# Patient Record
Sex: MALE | Race: AMERICAN INDIAN OR ALASKA NATIVE | NOT HISPANIC OR LATINO | URBAN - METROPOLITAN AREA
[De-identification: names, ages, dates, MRNs, and addresses within clinical notes are randomized per-mention and may not be internally consistent; named-entity substitution may affect disease eponyms.]

---

## 2018-03-23 ENCOUNTER — EMERGENCY (EMERGENCY)
Facility: HOSPITAL | Age: 53
LOS: 1 days | Discharge: HOME | End: 2018-03-23
Attending: EMERGENCY MEDICINE | Admitting: EMERGENCY MEDICINE
Payer: COMMERCIAL

## 2018-03-23 VITALS
DIASTOLIC BLOOD PRESSURE: 94 MMHG | OXYGEN SATURATION: 100 % | SYSTOLIC BLOOD PRESSURE: 155 MMHG | HEART RATE: 83 BPM | TEMPERATURE: 98 F | RESPIRATION RATE: 16 BRPM

## 2018-03-23 PROCEDURE — 99282 EMERGENCY DEPT VISIT SF MDM: CPT

## 2018-03-23 NOTE — ED PROVIDER NOTE - MUSCULOSKELETAL MINIMAL EXAM
motor intact/nml gait. no midline spinal TTP. strength is 5/5/normal range of motion/neck supple/TENDERNESS

## 2018-03-23 NOTE — ED PROVIDER NOTE - MEDICAL DECISION MAKING DETAILS
51 y/o M pt with likely MSK. lower back pain. Low suspicion for fx or cord compression. Plan: instructions for appropriate Ibuprofen dosing. Out-pt f/u

## 2018-03-23 NOTE — ED PROVIDER NOTE - CHPI ED SYMPTOMS NEG
no bladder dysfunction/no visual changes, numbness/tingling, fever, chills, nausea, vomiting, urinary sx, abd pain,

## 2018-03-23 NOTE — ED PROVIDER NOTE - OBJECTIVE STATEMENT
53 y/o M pt with no sig PMHx, arrives to the ED c/o worsening (sitting) lower back pain with associated LLE pain s/p shoveling snow yesterday. Tylenol (last taken last night), [place], and Ibuprofen to no relief. Denies visual changes, abd pain, urinary sx, fever, chills, nausea, vomiting, falls, numbness/tingling or any other complaints. Allergic to Doxycycline. 51 y/o M pt with no sig PMHx, arrives to the ED c/o worsening (sitting) lower back pain with associated LLE pain s/p shoveling snow yesterday. Tylenol (last taken last night), [place], and Ibuprofen 200 mg to no relief. Denies visual changes, abd pain, urinary sx, fever, chills, nausea, vomiting, falls, numbness/tingling or any other complaints. Allergic to Doxycycline.

## 2018-03-23 NOTE — ED ADULT TRIAGE NOTE - CHIEF COMPLAINT QUOTE
pt c/o lower back pain after shoveling snow yesterday.  Pt ambulatory, denies paresthesias or weakness.  Denies PMH